# Patient Record
Sex: FEMALE | Race: WHITE | NOT HISPANIC OR LATINO | Employment: FULL TIME | URBAN - METROPOLITAN AREA
[De-identification: names, ages, dates, MRNs, and addresses within clinical notes are randomized per-mention and may not be internally consistent; named-entity substitution may affect disease eponyms.]

---

## 2018-11-02 ENCOUNTER — CONVERSION ENCOUNTER (OUTPATIENT)
Dept: OTHER | Facility: HOSPITAL | Age: 48
End: 2018-11-02

## 2018-11-14 ENCOUNTER — CONVERSION ENCOUNTER (OUTPATIENT)
Dept: OTHER | Facility: HOSPITAL | Age: 48
End: 2018-11-14

## 2019-06-04 VITALS
WEIGHT: 146 LBS | DIASTOLIC BLOOD PRESSURE: 85 MMHG | SYSTOLIC BLOOD PRESSURE: 131 MMHG | HEIGHT: 61 IN | RESPIRATION RATE: 16 BRPM | HEART RATE: 74 BPM | HEIGHT: 61 IN | DIASTOLIC BLOOD PRESSURE: 88 MMHG | OXYGEN SATURATION: 99 % | OXYGEN SATURATION: 96 % | SYSTOLIC BLOOD PRESSURE: 134 MMHG | BODY MASS INDEX: 26.73 KG/M2 | HEART RATE: 81 BPM | WEIGHT: 141.6 LBS | BODY MASS INDEX: 27.56 KG/M2

## 2022-08-19 RX ORDER — ATENOLOL 25 MG/1
25 TABLET ORAL DAILY
COMMUNITY

## 2022-08-19 RX ORDER — FLUTICASONE PROPIONATE 50 MCG
2 SPRAY, SUSPENSION (ML) NASAL DAILY
COMMUNITY
End: 2022-08-29

## 2022-08-19 RX ORDER — AMLODIPINE BESYLATE 5 MG/1
5 TABLET ORAL 2 TIMES DAILY
COMMUNITY

## 2022-08-29 ENCOUNTER — CONSULT (OUTPATIENT)
Dept: CARDIOLOGY | Facility: CLINIC | Age: 52
End: 2022-08-29

## 2022-08-29 VITALS
DIASTOLIC BLOOD PRESSURE: 88 MMHG | SYSTOLIC BLOOD PRESSURE: 130 MMHG | WEIGHT: 167 LBS | OXYGEN SATURATION: 97 % | BODY MASS INDEX: 32.79 KG/M2 | HEIGHT: 60 IN | HEART RATE: 77 BPM

## 2022-08-29 DIAGNOSIS — R00.2 PALPITATIONS: Primary | ICD-10-CM

## 2022-08-29 DIAGNOSIS — I10 ESSENTIAL HYPERTENSION: ICD-10-CM

## 2022-08-29 PROBLEM — K58.9 IRRITABLE BOWEL SYNDROME: Status: ACTIVE | Noted: 2018-06-04

## 2022-08-29 PROBLEM — Z86.69 HISTORY OF MIGRAINE: Status: ACTIVE | Noted: 2018-06-13

## 2022-08-29 PROBLEM — G45.9 TRANSIENT ISCHEMIC ATTACK: Status: ACTIVE | Noted: 2018-06-13

## 2022-08-29 PROBLEM — Z82.49 FAMILY HISTORY OF CARDIOVASCULAR DISEASE: Status: ACTIVE | Noted: 2022-08-09

## 2022-08-29 PROCEDURE — 93000 ELECTROCARDIOGRAM COMPLETE: CPT | Performed by: INTERNAL MEDICINE

## 2022-08-29 PROCEDURE — 99204 OFFICE O/P NEW MOD 45 MIN: CPT | Performed by: INTERNAL MEDICINE

## 2022-08-29 RX ORDER — LOSARTAN POTASSIUM 25 MG/1
TABLET ORAL
COMMUNITY
End: 2022-08-29

## 2022-08-29 RX ORDER — TOBRAMYCIN AND DEXAMETHASONE 3; 1 MG/ML; MG/ML
SUSPENSION/ DROPS OPHTHALMIC
COMMUNITY
End: 2022-08-29

## 2022-08-29 RX ORDER — KETOROLAC TROMETHAMINE 10 MG/1
TABLET, FILM COATED ORAL
COMMUNITY
End: 2022-08-29 | Stop reason: ALTCHOICE

## 2022-08-29 RX ORDER — HYDROXYZINE HYDROCHLORIDE 25 MG/1
TABLET, FILM COATED ORAL
COMMUNITY
End: 2022-08-29

## 2022-08-29 NOTE — PATIENT INSTRUCTIONS
2-D echo  Labs     Thyroid panel, Lipid, panel, CMP, CBC  KardiaMobile device for rhythm detection  Consider heart and vascular screening  Follow-up 3 months

## 2022-08-29 NOTE — PROGRESS NOTES
Cardiology Office Consultation      Encounter Date:  08/29/2022    Patient ID:   Tita De Leon is a 51 y.o. female.    Reason For Consultation:  Palpitations    History of Present Illness:  Dear Suyapa Espinosa APRN    I had the pleasure of seeing Tita De Leon today. As you are well aware, this is a 51 y.o. female with no established history of ischemic heart disease.  She does have a history of hypertension.  She presents today for the evaluation of palpitations.    She reports the palpitations have been going on for the past 6 to 12 months.  She reports that they are very rare.  In fact she reports only 2 episodes in the past 6 to 12 months.  When they occur however they are quite concerning.  They will last for hours.  She notes that her blood pressure is elevated during these episodes.  Typically her blood pressure is well controlled on her current medicines but during these episodes it will climb to 140 or higher systolically.  She reports that when the episodes occur, anxiety seems to perpetuate them.  She reports no chest discomfort when this occurs but does report that her face will get hot and flushed.  She does not drink excessive amounts of caffeine.  She has had no medication changes recently.  She does report that she feels that she is on the tail end of menopause and has been contemplating taking some herbal remedies.  She was cautioned against estrogen or progestin containing compounds.    She is on atenolol and thinks that this was started because of palpitations but she does not recall.  She reports that she has had no biochemical work-up performed.    Assessment & Plan    Impressions:  Palpitations  Hypertension    Recommendations:  2D echocardiogram  Laboratory studies     CBC, CMP, thyroid panel, lipid panel  Consider purchasing a cardia mobile device to evaluate rhythm when this occurs  Consider heart and vascular screening examination  Follow-up in 3 months      Diagnoses and all  "orders for this visit:    1. Palpitations (Primary)  -     Adult Transthoracic Echo Complete W/ Cont if Necessary Per Protocol; Future  -     CBC & Differential; Future  -     Comprehensive Metabolic Panel; Future  -     Lipid Panel; Future  -     Thyroid Panel With TSH; Future  -     ECG 12 Lead    2. Essential hypertension  -     ECG 12 Lead         Objective:    Vitals:  Vitals:    08/29/22 1330   BP: 130/88   Pulse: 77   SpO2: 97%   Weight: 75.8 kg (167 lb)   Height: 152.4 cm (60\")     Body mass index is 32.61 kg/m².    Physical Exam:    General: Alert, cooperative, no distress, appears stated age  Head:  Normocephalic, atraumatic, mucous membranes moist  Eyes:  Conjunctiva/corneas clear, EOM's intact     Neck:  Supple,  no bruit    Lungs: Clear to auscultation bilaterally, no wheezes rhonchi rales are noted  Chest wall: No tenderness  Heart::  Regular rate and rhythm, S1 and S2 normal, no murmur, rub or gallop  Abdomen: Soft, non-tender, nondistended bowel sounds active  Extremities: No cyanosis, clubbing, or edema  Pulses: 2+ and symmetric all extremities  Skin:  No rashes or lesions  Neuro/psych: A&O x3. CN II through XII are grossly intact with appropriate affect    History of Present Illness      Allergies:  Allergies   Allergen Reactions   • Amoxicillin Unknown - Low Severity   • Diphenhydramine Unknown - Low Severity   • Sulfa Antibiotics Anaphylaxis   • Lisinopril Cough   • Losartan Dizziness       Medication Review:     Current Outpatient Medications:   •  amLODIPine (NORVASC) 5 MG tablet, Take 5 mg by mouth 2 (Two) Times a Day., Disp: , Rfl:   •  atenolol (TENORMIN) 25 MG tablet, Take 25 mg by mouth Daily., Disp: , Rfl:     Family History:  Family History   Problem Relation Age of Onset   • Heart attack Father    • Stroke Father    • Heart disease Father    • Cancer Maternal Grandmother    • Cancer Maternal Grandfather        Past Medical History:  Past Medical History:   Diagnosis Date   • Cerebral " ischemia    • HTN (hypertension)    • IBS (irritable bowel syndrome)    • Migraine    • Palpitations        Past surgical History:  History reviewed. No pertinent surgical history.    Social History:  Social History     Socioeconomic History   • Marital status:    Tobacco Use   • Smoking status: Never Smoker   • Smokeless tobacco: Never Used   Substance and Sexual Activity   • Alcohol use: Never   • Drug use: Never       Review of Systems:  The following systems were reviewed as they relate to the cardiovascular system: Constitutional, Eyes, ENT, Cardiovascular, Respiratory, Gastrointestinal, Integumentary, Neurological, Psychiatric, Hematologic, Endocrine, Musculoskeletal, and Genitourinary. The pertinent cardiovascular findings are reported above with all other cardiovascular points within those systems being negative.    Diagnostic Study Review:     Current Electrocardiogram:    ECG 12 Lead    Date/Time: 8/29/2022 2:54 PM  Performed by: Matthias Neal DO  Authorized by: Matthias Neal DO   Comparison: not compared with previous ECG   Previous ECG: no previous ECG available  Comments: Normal sinus rhythm with a ventricular rate of 67 bpm.  The voltage in the precordial leads.  Normal QT and QTc intervals.            Laboratory Data:  No results found for: GLUCOSE, BUN, CREATININE, EGFRIFNONA, EGFRIFAFRI, BCR, K, CO2, CALCIUM, PROTENTOTREF, ALBUMIN, LABIL2, BILIRUBIN, AST, ALT  No results found for: GLUCOSE, CALCIUM, NA, K, CO2, CL, BUN, CREATININE, EGFRIFAFRI, EGFRIFNONA, BCR, ANIONGAP  No results found for: WBC, HGB, HCT, MCV, PLT  No results found for: CHOL, CHLPL, TRIG, HDL, LDL, LDLDIRECT  No results found for: HGBA1C  No results found for: INR, PROTIME    Most Recent Echo:       Most Recent Stress Test:       Most Recent Cardiac Catheterization:   No results found for this or any previous visit.       NOTE: The following portions of the patient's note were reviewed,  confirmed and/or updated this visit as appropriate: History of present illness/Interval history, physical examination, assessment & plan, allergies, current medications, past family history, past medical history, past social history, past surgical history and problem list.

## 2022-12-05 ENCOUNTER — OFFICE VISIT (OUTPATIENT)
Dept: CARDIOLOGY | Facility: CLINIC | Age: 52
End: 2022-12-05

## 2022-12-05 VITALS
DIASTOLIC BLOOD PRESSURE: 80 MMHG | WEIGHT: 175 LBS | HEART RATE: 84 BPM | BODY MASS INDEX: 34.36 KG/M2 | OXYGEN SATURATION: 94 % | HEIGHT: 60 IN | SYSTOLIC BLOOD PRESSURE: 125 MMHG

## 2022-12-05 DIAGNOSIS — I10 BENIGN ESSENTIAL HYPERTENSION: Primary | ICD-10-CM

## 2022-12-05 DIAGNOSIS — R00.2 PALPITATIONS: ICD-10-CM

## 2022-12-05 PROCEDURE — 99213 OFFICE O/P EST LOW 20 MIN: CPT | Performed by: INTERNAL MEDICINE

## 2022-12-05 NOTE — PROGRESS NOTES
Cardiology Office Visit      Encounter Date:  12/05/2022    Patient ID:   Tita De Leon is a 52 y.o. female.    Reason For Followup:  Palpitations    Brief Clinical History:  Dear Suyapa Espinosa APRN    I had the pleasure of seeing Tita De Leon today. As you are well aware, this is a 52 y.o. female with no established history of ischemic heart disease.  She does have a history of hypertension and palpitations.  She presents today for the follow-up evaluation of palpitations.    Interval History:  She denies any chest pain pressure heaviness or tightness.  She denies any shortness of breath.  She denies any PND orthopnea.  She denies any syncope or near syncope.  She reports feeling well today from a cardiac perspective.  Specifically she has had no further episodes of tachycardia.    As a result of her symptoms, she underwent a 2D echocardiogram and a laboratory work-up.  Her 2D echocardiogram demonstrated normal left ventricular systolic function and no evidence of significant structural abnormalities.  Laboratory function was reviewed today in detail and no significant abnormalities were noted.    She reports the palpitations have been going on for the past 6 to 12 months.  She reports that they are very rare.  In fact she reports only 2 episodes in the past 6 to 12 months.  When they occur however they are quite concerning.  They will last for hours.  She notes that her blood pressure is elevated during these episodes.  Typically her blood pressure is well controlled on her current medicines but during these episodes it will climb to 140 or higher systolically.  She reports that when the episodes occur, anxiety seems to perpetuate them.  She reports no chest discomfort when this occurs but does report that her face will get hot and flushed.  She does not drink excessive amounts of caffeine.  She has had no medication changes recently.  She does report that she feels that she is on the tail end of  "menopause and has been contemplating taking some herbal remedies.  She was cautioned against estrogen or progestin containing compounds.    She did purchase a cardia mobile device but has misplaced it.  She will work on locating it and monitor her rhythm periodically.    Assessment & Plan    Impressions:  Palpitations  Hypertension    Recommendations:  Continuation of her current cardiovascular regimen at the present time.     This includes antihypertensives and beta-blocker  Periodic monitoring of her rhythm with cardia mobile device  Follow-up as needed.    Diagnoses and all orders for this visit:    1. Benign essential hypertension (Primary)    2. Palpitations          Objective:    Vitals:  Vitals:    12/05/22 1514   BP: 125/80   Pulse: 84   SpO2: 94%   Weight: 79.4 kg (175 lb)   Height: 152.4 cm (60\")     Body mass index is 34.18 kg/m².      Physical Exam:    General: Alert, cooperative, no distress, appears stated age  Head:  Normocephalic, atraumatic, mucous membranes moist  Eyes:  Conjunctiva/corneas clear, EOM's intact     Neck:  Supple,  no bruit    Lungs: Clear to auscultation bilaterally, no wheezes rhonchi rales are noted  Chest wall: No tenderness  Heart::  Regular rate and rhythm, S1 and S2 normal, 1/6 holosystolic murmur.  No rub or gallop  Abdomen: Soft, non-tender, nondistended bowel sounds active  Extremities: No cyanosis, clubbing, or edema  Pulses: 2+ and symmetric all extremities  Skin:  No rashes or lesions  Neuro/psych: A&O x3. CN II through XII are grossly intact with appropriate affect      Allergies:  Allergies   Allergen Reactions   • Amoxicillin Unknown - Low Severity   • Diphenhydramine Unknown - Low Severity   • Sulfa Antibiotics Anaphylaxis   • Lisinopril Cough   • Losartan Dizziness       Medication Review:     Current Outpatient Medications:   •  amLODIPine (NORVASC) 5 MG tablet, Take 5 mg by mouth 2 (Two) Times a Day., Disp: , Rfl:   •  atenolol (TENORMIN) 25 MG tablet, Take 25 mg " by mouth Daily., Disp: , Rfl:     Family History:  Family History   Problem Relation Age of Onset   • Heart attack Father    • Stroke Father    • Heart disease Father    • Cancer Maternal Grandmother    • Cancer Maternal Grandfather        Past Medical History:  Past Medical History:   Diagnosis Date   • Cerebral ischemia    • HTN (hypertension)    • IBS (irritable bowel syndrome)    • Migraine    • Palpitations        Past Surgical History:  History reviewed. No pertinent surgical history.    Social History:  Social History     Socioeconomic History   • Marital status:    Tobacco Use   • Smoking status: Never   • Smokeless tobacco: Never   Substance and Sexual Activity   • Alcohol use: Never   • Drug use: Never       Review of Systems:  The following systems were reviewed as they relate to the cardiovascular system: Constitutional, Eyes, ENT, Cardiovascular, Respiratory, Gastrointestinal, Integumentary, Neurological, Psychiatric, Hematologic, Endocrine, Musculoskeletal, and Genitourinary. The pertinent cardiovascular findings are reported above with all other cardiovascular points within those systems being negative.    Diagnostic Study Review:     Current Electrocardiogram:  Procedures no new EKG.  EKG dated 8/29/2022 demonstrates sinus rhythm with a ventricular rate of 67 bpm.    Laboratory Data:  No results found for: GLUCOSE, BUN, CREATININE, EGFRIFNONA, EGFRIFAFRI, BCR, K, CO2, CALCIUM, PROTENTOTREF, ALBUMIN, LABIL2, BILIRUBIN, AST, ALT  No results found for: GLUCOSE, CALCIUM, NA, K, CO2, CL, BUN, CREATININE, EGFRIFAFRI, EGFRIFNONA, BCR, ANIONGAP  No results found for: WBC, HGB, HCT, MCV, PLT  No results found for: CHOL, CHLPL, TRIG, HDL, LDL, LDLDIRECT  No results found for: HGBA1C  No results found for: INR, PROTIME    Most Recent Echo:       Most Recent Stress Test:       Most Recent Cardiac Catheterization:   No results found for this or any previous visit.       NOTE: The following portions of the  patient's note were reviewed, confirmed and/or updated this visit as appropriate: History of present illness/Interval history, physical examination, assessment & plan, allergies, current medications, past family history, past medical history, past social history, past surgical history and problem list.

## 2024-12-13 ENCOUNTER — TELEPHONE (OUTPATIENT)
Dept: CARDIOLOGY | Facility: CLINIC | Age: 54
End: 2024-12-13

## 2024-12-13 NOTE — TELEPHONE ENCOUNTER
Caller: Kwasi De Leon    Relationship: Emergency Contact    Best call back number: [phone]      What was the call regarding: PATIENT'S SPOUSE STATED THAT SHE HAD STRESS TEST DONE AT [facility] AND WAS TOLD THAT THE STRESS TEST RESULTS WOULD BE SENT TO PC OR CARDIOLOGY AND WANTED TO KNOW IF WE HAD RECEIVED THEM. PLEASE ADVISE.